# Patient Record
Sex: FEMALE | Race: WHITE | NOT HISPANIC OR LATINO | Employment: OTHER | URBAN - METROPOLITAN AREA
[De-identification: names, ages, dates, MRNs, and addresses within clinical notes are randomized per-mention and may not be internally consistent; named-entity substitution may affect disease eponyms.]

---

## 2024-05-14 ENCOUNTER — TELEPHONE (OUTPATIENT)
Dept: UROLOGY | Facility: CLINIC | Age: 79
End: 2024-05-14

## 2024-05-14 NOTE — TELEPHONE ENCOUNTER
Pt was calling in regards to , I scheduled her  for an appointment with Dr. Shepard, no questions.     ----- Message from Portillo Tuttle sent at 5/14/2024  2:08 PM CDT -----  Contact: [phone]  Patient called in requesting a call back , about an appointment that was scheduled please call back  [phone]